# Patient Record
Sex: FEMALE
[De-identification: names, ages, dates, MRNs, and addresses within clinical notes are randomized per-mention and may not be internally consistent; named-entity substitution may affect disease eponyms.]

---

## 2019-08-05 ENCOUNTER — HOSPITAL ENCOUNTER (EMERGENCY)
Dept: HOSPITAL 97 - ER | Age: 19
Discharge: HOME | End: 2019-08-05
Payer: COMMERCIAL

## 2019-08-05 DIAGNOSIS — N83.299: Primary | ICD-10-CM

## 2019-08-05 LAB
BUN BLD-MCNC: 10 MG/DL (ref 7–18)
GLUCOSE SERPLBLD-MCNC: 73 MG/DL (ref 74–106)
HCG SERPL-ACNC: < 1 MIU/ML (ref 1–3)
HCT VFR BLD CALC: 40.1 % (ref 36–45)
LYMPHOCYTES # SPEC AUTO: 2.3 K/UL (ref 0.7–4.9)
PMV BLD: 8.7 FL (ref 7.6–11.3)
POTASSIUM SERPL-SCNC: 3.5 MMOL/L (ref 3.5–5.1)
RBC # BLD: 4.38 M/UL (ref 3.86–4.86)

## 2019-08-05 PROCEDURE — 81025 URINE PREGNANCY TEST: CPT

## 2019-08-05 PROCEDURE — 80048 BASIC METABOLIC PNL TOTAL CA: CPT

## 2019-08-05 PROCEDURE — 74177 CT ABD & PELVIS W/CONTRAST: CPT

## 2019-08-05 PROCEDURE — 84702 CHORIONIC GONADOTROPIN TEST: CPT

## 2019-08-05 PROCEDURE — 81003 URINALYSIS AUTO W/O SCOPE: CPT

## 2019-08-05 PROCEDURE — 36415 COLL VENOUS BLD VENIPUNCTURE: CPT

## 2019-08-05 PROCEDURE — 99284 EMERGENCY DEPT VISIT MOD MDM: CPT

## 2019-08-05 PROCEDURE — 85025 COMPLETE CBC W/AUTO DIFF WBC: CPT

## 2019-08-05 NOTE — EDPHYS
Physician Documentation                                                                           

 [facility]                                                                 

Name: Stacey Kenney                                                                          

Age: 18 yrs                                                                                       

Sex: Female                                                                                       

: 2000                                                                                   

MRN: [id number]                                                                                   

Arrival Date: 2019                                                                          

Time: 14:25                                                                                       

Account#: [id number]                                                                            

Bed 11                                                                                            

Private MD:                                                                                       

ED Physician Micha Solorio                                                                      

HPI:                                                                                              

                                                                                             

01:31 This 18 yrs old  Female presents to ER via Ambulatory with complaints of        kb  

      Abdominal Pain.                                                                             

01:31 The patient presents with abdominal pain right lower quadrant. Onset: The               kb  

      symptoms/episode began/occurred 5 day(s) ago. The symptoms do not radiate. Associated       

      signs and symptoms: Pertinent positives: breast tenderness. The symptoms are described      

      as constant. Modifying factors: The symptoms are alleviated by nothing, the symptoms        

      are aggravated by nothing. Severity of pain: At its worst the pain was moderate in the      

      emergency department the pain is unchanged. The patient has not experienced similar         

      symptoms in the past. The patient has been recently seen by a physician: the patient's      

      primary care provider, with similar presenting complaints, and was sent to the              

      [facility] Emergency Department for further evaluation. Pt           

      reports RLQ pain that started 5 days ago. States she was going to see if it would just      

      go away, but it didn't so she went to her dr. States her Dr sent her here because he        

      said it was either her appendix or pregnancy.                                               

                                                                                                  

OB/GYN:                                                                                           

                                                                                             

14:42 LMP 2019                                                                           hb  

                                                                                                  

Historical:                                                                                       

- Allergies:                                                                                      

14:43 No Known Allergies;                                                                     hb  

- Home Meds:                                                                                      

14:43 None [Active];                                                                          hb  

- PMHx:                                                                                           

14:43 None;                                                                                   hb  

- PSHx:                                                                                           

14:43 None;                                                                                   hb  

                                                                                                  

- Immunization history:: Adult Immunizations up to date.                                          

- Social history:: Smoking status: Patient/guardian denies using tobacco.                         

- Ebola Screening: : No symptoms or risks identified at this time.                                

                                                                                                  

                                                                                                  

ROS:                                                                                              

                                                                                             

01:31 Constitutional: Negative for fever, chills, and weight loss, ENT: Negative for injury,  kb  

      pain, and discharge, Neck: Negative for injury, pain, and swelling, Cardiovascular:         

      Negative for chest pain, palpitations, and edema, Respiratory: Negative for shortness       

      of breath, cough, wheezing, and pleuritic chest pain, Back: Negative for injury and         

      pain, : Negative for injury, bleeding, discharge, and swelling, MS/Extremity:             

      Negative for injury and deformity, Skin: Negative for injury, rash, and discoloration,      

      Neuro: Negative for headache, weakness, numbness, tingling, and seizure.                    

      Abdomen/GI: Positive for abdominal pain.                                                    

                                                                                                  

Exam:                                                                                             

:31 Constitutional:  This is a well developed, well nourished patient who is awake, alert,  kb  

      and in no acute distress. Head/Face:  Normocephalic, atraumatic. Neck:  Trachea             

      midline, no thyromegaly or masses palpated, and no cervical lymphadenopathy.  Supple,       

      full range of motion without nuchal rigidity, or vertebral point tenderness.  No            

      Meningismus. Chest/axilla:  Normal chest wall appearance and motion.  Nontender with no     

      deformity.  No lesions are appreciated. Cardiovascular:  Regular rate and rhythm with a     

      normal S1 and S2.  No gallops, murmurs, or rubs.  Normal PMI, no JVD.  No pulse             

      deficits. Respiratory:  Lungs have equal breath sounds bilaterally, clear to                

      auscultation and percussion.  No rales, rhonchi or wheezes noted.  No increased work of     

      breathing, no retractions or nasal flaring. Back:  No spinal tenderness.  No                

      costovertebral tenderness.  Full range of motion. Skin:  Warm, dry with normal turgor.      

      Normal color with no rashes, no lesions, and no evidence of cellulitis. MS/ Extremity:      

      Pulses equal, no cyanosis.  Neurovascular intact.  Full, normal range of motion. Neuro:     

       Awake and alert, GCS 15, oriented to person, place, time, and situation.  Cranial          

      nerves II-XII grossly intact.  Motor strength 5/5 in all extremities.  Sensory grossly      

      intact.  Cerebellar exam normal.  Normal gait.                                              

:31 Abdomen/GI: Inspection: abdomen appears normal, Bowel sounds: normal, in all quadrants,     

      Palpation: soft, in all quadrants, moderate abdominal tenderness, in the right lower        

      quadrant and left lower quadrant.                                                           

                                                                                                  

Vital Signs:                                                                                      

                                                                                             

14:42  / 77; Pulse 87; Resp 16; Temp 98.5; Pulse Ox 99% on R/A; Weight 65.77 kg; Height hb  

      5 ft. 1 in. (154.94 cm); Pain 8/10;                                                         

15:33  / 89; Pulse 95; Resp 18; Pulse Ox 100% on R/A;                                   aj1 

19:32  / 78; Pulse 70; Resp 16; Pulse Ox 99% on R/A;                                    aj1 

14:42 Body Mass Index 27.40 (65.77 kg, 154.94 cm)                                             hb  

                                                                                                  

MDM:                                                                                              

15:23 Patient medically screened.                                                             kb  

                                                                                             

01:33 Data reviewed: vital signs, nurses notes. Data interpreted: Pulse oximetry: on room air kb  

      is 99 %. Interpretation: normal. Counseling: I had a detailed discussion with the           

      patient and/or guardian regarding: the historical points, exam findings, and any            

      diagnostic results supporting the discharge/admit diagnosis, lab results, radiology         

      results, the need for outpatient follow up, an OB/Gyne specialist, to return to the         

      emergency department if symptoms worsen or persist or if there are any questions or         

      concerns that arise at home.                                                                

                                                                                                  

                                                                                             

15:46 Order name: Basic Metabolic Panel; Complete Time: 16:49                                 kb  

                                                                                             

15:46 Order name: CBC with Diff; Complete Time: 16:31                                         kb  

                                                                                             

15:46 Order name: Quantitative Hcg; Complete Time: 16:49                                      kb  

                                                                                             

16:08 Order name: Urine Dipstick--Ancillary (enter results); Complete Time: 16:31             bd  

                                                                                             

16:08 Order name: Urine Pregnancy--Ancillary (enter results); Complete Time: 16:31            bd  

                                                                                             

16:49 Order name: CT Abd/Pelvis - IV Contrast Only; Complete Time: 17:49                      kb  

                                                                                             

15:38 Order name: Urine Dipstick-Ancillary (obtain specimen); Complete Time: 15:48            kb  

                                                                                             

15:38 Order name: Urine Pregnancy Test (obtain specimen); Complete Time: 15:48                kb  

                                                                                             

15:46 Order name: IV Saline Lock; Complete Time: 16:28                                        kb  

                                                                                             

15:46 Order name: Labs collected and sent; Complete Time: 16:28                               kb  

                                                                                                  

Administered Medications:                                                                         

No medications were administered                                                                  

                                                                                                  

                                                                                                  

Disposition:                                                                                      

07:57 Co-signature as Attending Physician, Micha Solorio MD I agree with the assessment and  wa  

      plan of care.                                                                               

                                                                                                  

Disposition:                                                                                      

19 18:50 Discharged to Home. Impression: Other ovarian cysts.                               

- Condition is Stable.                                                                            

- Discharge Instructions: Ovarian Cyst, Easy-to-Read.                                             

- Prescriptions for Diclofenac Sodium 75 mg Oral Tablet, Delayed Release (E.C.) - take            

  1 tablet by ORAL route 2 times per day As needed; 30 tablet.                                    

- Medication Reconciliation Form, Thank You Letter, Antibiotic Education, Prescription            

  Opioid Use form.                                                                                

- Follow up: Emergency Department; When: As needed; Reason: Worsening of condition.               

  Follow up: Private Physician; When: 2 - 3 days; Reason: Recheck today's complaints,             

  Continuance of care, Re-evaluation by your physician.                                           

                                                                                                  

                                                                                                  

                                                                                                  

Signatures:                                                                                       

Dispatcher [employer]                           EDMS                                                 

Shelly Freed, SANTY WANG-Janett Kim RN                     RN   aj1                                                  

Charlee Schwartz RN RN                                                      

Micha Solorio MD MD   wa                                                   

                                                                                                  

Corrections: (The following items were deleted from the chart)                                    

                                                                                             

19:33 18:50 2019 18:50 Discharged to Home. Impression: Other ovarian cysts. Condition   aj1 

      is Stable. Forms are Medication Reconciliation Form, Thank You Letter, Antibiotic           

      Education, Prescription Opioid Use. Follow up: Emergency Department; When: As needed;       

      Reason: Worsening of condition. Follow up: Private Physician; When: 2 - 3 days; Reason:     

      Recheck today's complaints, Continuance of care, Re-evaluation by your physician. kb        

                                                                                                  

**************************************************************************************************

## 2019-08-05 NOTE — XMS REPORT
Patient Summary Document

 Created on:2019



Patient:VINAYAK KRISHNAMURTHY

Sex:Female

:2000

External Reference #:[id number]





Demographics







 Address  [address] [place] [id number]

 

 Home Phone  [phone]

 

 Work Phone  [phone]

 

 Email Address  [email or web address]

 

 Preferred Language  Unknown

 

 Marital Status  Unknown

 

 Roman [employer] Affiliation  Unknown

 

 Race  Unknown

 

 Additional Race(s)  Unavailable

 

 Ethnic Group  Unknown









Author







 Organization  [facility]

 

 Address  [address] [place] [address] Dr. Sow [address] [place] [id number]

 

 Phone  [phone]









Care Team Providers







 Name  Role  Phone

 

 Unavailable  Unavailable  Unavailable









Problems

This patient has no known problems.



Allergies, Adverse Reactions, Alerts

This patient has no known allergies or adverse reactions.



Medications

This patient has no known medications.

## 2019-08-05 NOTE — ER
Nurse's Notes                                                                                     

 [facility]                                                                 

Name: Stacey Kenney                                                                          

Age: 18 yrs                                                                                       

Sex: Female                                                                                       

: 2000                                                                                   

MRN: [id number]                                                                                   

Arrival Date: 2019                                                                          

Time: 14:25                                                                                       

Account#: [id number]                                                                            

Bed 11                                                                                            

Private MD:                                                                                       

Diagnosis: Other ovarian cysts                                                                    

                                                                                                  

Presentation:                                                                                     

                                                                                             

14:40 Presenting complaint: Abdominal pain x 5 days. Pt report pain started in the RLQ, now   hb  

      pain is diffuse. Denies N/V/D/fever. LMP . Transition of care: patient was not          

      received from another setting of care. Onset of symptoms was 2019. Risk          

      Assessment: Do you want to hurt yourself or someone else? Patient reports no desire to      

      harm self or others. Initial Sepsis Screen: Does the patient meet any 2 criteria? No.       

      Patient's initial sepsis screen is negative. Does the patient have a suspected source       

      of infection? No. Patient's initial sepsis screen is negative. Care prior to arrival:       

      None.                                                                                       

14:40 Method Of Arrival: Ambulatory                                                           hb  

14:40 Acuity: BARON 3                                                                           hb  

                                                                                                  

OB/GYN:                                                                                           

14:42 LMP 2019                                                                           hb  

                                                                                                  

Historical:                                                                                       

- Allergies:                                                                                      

14:43 No Known Allergies;                                                                     hb  

- Home Meds:                                                                                      

14:43 None [Active];                                                                          hb  

- PMHx:                                                                                           

14:43 None;                                                                                   hb  

- PSHx:                                                                                           

14:43 None;                                                                                   hb  

                                                                                                  

- Immunization history:: Adult Immunizations up to date.                                          

- Social history:: Smoking status: Patient/guardian denies using tobacco.                         

- Ebola Screening: : No symptoms or risks identified at this time.                                

                                                                                                  

                                                                                                  

Screening:                                                                                        

15:33 Abuse screen: Denies threats or abuse. Denies injuries from another. Nutritional        aj1 

      screening: No deficits noted. Tuberculosis screening: No symptoms or risk factors           

      identified.                                                                                 

19:33 Fall Risk None identified.                                                              aj1 

                                                                                                  

Assessment:                                                                                       

15:33 General: Appears in no apparent distress. comfortable, Behavior is calm, cooperative,   aj1 

      appropriate for age. Pain: Complains of pain in right lower quadrant Pain does not          

      radiate. Pain currently is 7 out of 10 on a pain scale. Neuro: Level of Consciousness       

      is awake, alert, obeys commands, Oriented to person, place, time, situation.                

      Cardiovascular: Patient's skin is warm and dry. Respiratory: Airway is patent               

      Respiratory effort is even, unlabored, Respiratory pattern is regular, symmetrical. GI:     

      Abdomen is non-distended, Bowel sounds present X 4 quads. Abd is soft X 4 quads Abdomen     

      is tender to palpation in right lower quadrant. : No signs and/or symptoms were           

      reported regarding the genitourinary system. EENT: No signs and/or symptoms were            

      reported regarding the EENT system. Derm: No signs and/or symptoms reported regarding       

      the dermatologic system. Skin is pink, warm \T\ dry. normal. Musculoskeletal: No signs      

      and/or symptoms reported regarding the musculoskeletal system. Circulation, motion, and     

      sensation intact.                                                                           

16:37 Reassessment: Patient appears in no apparent distress at this time. No changes from     aj1 

      previously documented assessment. Patient and/or family updated on plan of care and         

      expected duration. Pain level reassessed. Patient is alert, oriented x 3, equal             

      unlabored respirations, skin warm/dry/pink.                                                 

17:30 Reassessment: Patient and/or family updated on plan of care and expected duration. Pain aj1 

      level reassessed. General: Appears in no apparent distress. comfortable, Behavior is        

      calm, cooperative, appropriate for age. Neuro: Level of Consciousness is awake, alert,      

      obeys commands, Oriented to person, place, time, situation. Cardiovascular: Patient's       

      skin is warm and dry. Respiratory: Airway is patent Respiratory effort is even,             

      unlabored, Respiratory pattern is regular, symmetrical. GI: Abdomen is flat,                

      non-distended. Derm: No signs and/or symptoms reported regarding the dermatologic           

      system. Skin is pink, warm \T\ dry. normal. Musculoskeletal: No signs and/or symptoms       

      reported regarding the musculoskeletal system. Circulation, motion, and sensation           

      intact.                                                                                     

18:15 Reassessment: Patient appears in no apparent distress at this time. No changes from     aj1 

      previously documented assessment. Patient and/or family updated on plan of care and         

      expected duration. Pain level reassessed. Patient is alert, oriented x 3, equal             

      unlabored respirations, skin warm/dry/pink.                                                 

19:31 Reassessment: Patient appears in no apparent distress at this time. No changes from     aj1 

      previously documented assessment. Patient and/or family updated on plan of care and         

      expected duration. Pain level reassessed. Patient is alert, oriented x 3, equal             

      unlabored respirations, skin warm/dry/pink.                                                 

                                                                                                  

Vital Signs:                                                                                      

14:42  / 77; Pulse 87; Resp 16; Temp 98.5; Pulse Ox 99% on R/A; Weight 65.77 kg; Height hb  

      5 ft. 1 in. (154.94 cm); Pain 8/10;                                                         

15:33  / 89; Pulse 95; Resp 18; Pulse Ox 100% on R/A;                                   aj1 

19:32  / 78; Pulse 70; Resp 16; Pulse Ox 99% on R/A;                                    aj1 

14:42 Body Mass Index 27.40 (65.77 kg, 154.94 cm)                                             hb  

                                                                                                  

ED Course:                                                                                        

14:25 Patient arrived in ED.                                                                  mr  

14:42 Triage completed.                                                                       hb  

14:43 Arm band placed on.                                                                     hb  

15:22 Shelly Freed FNP-C is PHCP.                                                        kb  

15:22 Micha Solorio MD is Attending Physician.                                             kb  

15:33 Janett Christopher, RN is Primary Nurse.                                                   aj1 

15:33 Patient has correct armband on for positive identification.                             aj1 

15:33 No provider procedures requiring assistance completed.                                  aj1 

16:28 Initial lab(s) drawn, by me, sent to lab. Inserted saline lock: 22 gauge in left        aj1 

      antecubital area, using aseptic technique. Blood collected.                                 

17:13 CT completed. Patient tolerated procedure well. Patient moved to CT. Patient moved back nj  

      from CT.                                                                                    

17:17 CT Abd/Pelvis - IV Contrast Only In Process Unspecified.                                EDMS

19:32 IV discontinued, intact, bleeding controlled, No redness/swelling at site. Pressure     aj1 

      dressing applied.                                                                           

                                                                                                  

Administered Medications:                                                                         

No medications were administered                                                                  

                                                                                                  

                                                                                                  

Outcome:                                                                                          

18:50 Discharge ordered by MD.                                                                kb  

19:32 Discharged to home ambulatory.                                                          aj1 

19:32 Condition: good                                                                             

19:32 Discharge instructions given to patient, Instructed on discharge instructions, follow       

      up and referral plans. medication usage, Demonstrated understanding of instructions,        

      follow-up care, medications, Prescriptions given X 1.                                       

19:33 Patient left the ED.                                                                    aj1 

                                                                                                  

Signatures:                                                                                       

Dispatcher [employer]                           EDMS                                                 

Shelly Freed FNP-C FNP-Janett Kim, RN                     RN   [facility]                                                  

Katja Hurtado                                                                                    

Charlee Schwartz RN RN                                                      

Lito Martinez                                                   

                                                                                                  

Corrections: (The following items were deleted from the chart)                                    

14:43 14:40 Presenting complaint: Abdominal pain x 5 days. Pt report pain started in the RLQ, hb  

      now pain is diffuse. Denies N/V/D/fever. LMP  hb                                        

15:37 15:33 Pain: Complains of pain in left lower quadrant Pain does not radiate. Pain        aj1 

      currently is 7 out of 10 on a pain scale. aj1                                               

15:37 15:33 GI: Abdomen is non-distended, Bowel sounds present X 4 quads. Abd is soft X 4     aj1 

      quads Abdomen is tender to palpation in left lower quadrant aj1                             

                                                                                                  

**************************************************************************************************

## 2019-08-05 NOTE — RAD REPORT
EXAM DESCRIPTION:  CT - Abdomen   Pelvis W Contrast - 8/5/2019 5:16 pm

 

CLINICAL HISTORY:  Abdominal pain

 

COMPARISON:  February 2018

 

TECHNIQUE:  Computed axial tomography of the abdomen pelvis was obtained. 100 cc Isovue-300 was admin
istered intravenously. Oral contrast was not requested which limits evaluation of bowel.

 

All CT scans are performed using dose optimization technique as appropriate and may include automated
 exposure control or mA/KV adjustment according to patient size.

 

FINDINGS:  Fatty liver

 

Spleen, pancreas, adrenal and left kidney appear unremarkable. 2 millimeter nonobstructing right farhat
l calculus

 

There is no evidence of diverticulitis.

 

5.5 centimeter right ovarian cyst with a small amount of free fluid

 

IMPRESSION:  5.5 centimeter right ovarian cyst with a small amount of free fluid . Follow up ultrasou
nd in 6 weeks recommended to assess stability/resolution

## 2020-02-15 ENCOUNTER — HOSPITAL ENCOUNTER (EMERGENCY)
Dept: HOSPITAL 97 - ER | Age: 20
Discharge: HOME | End: 2020-02-15
Payer: SELF-PAY

## 2020-02-15 DIAGNOSIS — J04.0: Primary | ICD-10-CM

## 2020-02-15 PROCEDURE — 87081 CULTURE SCREEN ONLY: CPT

## 2020-02-15 PROCEDURE — 87070 CULTURE OTHR SPECIMN AEROBIC: CPT

## 2020-02-15 PROCEDURE — 87804 INFLUENZA ASSAY W/OPTIC: CPT

## 2020-02-15 PROCEDURE — 99283 EMERGENCY DEPT VISIT LOW MDM: CPT

## 2020-02-15 NOTE — XMS REPORT
Patient Summary Document

 Created on:February 15, 2020



Patient:VINAYAK KRISHNAMURTHY

Sex:Female

:2000

External Reference #:[id number]





Demographics







 Address  [address] [place] [id number]

 

 Home Phone  [phone]

 

 Work Phone  [phone]

 

 Email Address  [email or web address]

 

 Preferred Language  Unknown

 

 Marital Status  Unknown

 

 Anabaptism Affiliation  Unknown

 

 Race  Unknown

 

 Additional Race(s)  Unavailable

 

 Ethnic Group  Unknown









Author







 Organization  [facility]

 

 Address  [address] [place] [address] Dr. Sow [address] [place] [id number]

 

 Phone  [phone]









Care Team Providers







 Name  Role  Phone

 

 Unavailable  Unavailable  Unavailable









Problems

This patient has no known problems.



Allergies, Adverse Reactions, Alerts

This patient has no known allergies or adverse reactions.



Medications

This patient has no known medications.

## 2020-02-15 NOTE — EDPHYS
Physician Documentation                                                                           

 [facility]                                                                 

Name: Stacey Kenney                                                                          

Age: 19 yrs                                                                                       

Sex: Female                                                                                       

: 2000                                                                                   

MRN: [id number]                                                                                   

Arrival Date: 02/15/2020                                                                          

Time: 17:30                                                                                       

Account#: [id number]                                                                            

Bed 18                                                                                            

Private MD: Micha Garcia E                                                                     

ED Physician Prince Gracia                                                                      

OB/GYN:                                                                                           

02/15                                                                                             

17:52 LMP 2020                                                                            aj1 

                                                                                                  

Historical:                                                                                       

- Allergies:                                                                                      

17:52 No Known Allergies;                                                                     aj1 

- Home Meds:                                                                                      

17:52 None [Active];                                                                          aj1 

- PMHx:                                                                                           

17:52 None;                                                                                   aj1 

- PSHx:                                                                                           

17:52 None;                                                                                   aj1 

                                                                                                  

- Immunization history:: Flu vaccine is not up to date.                                           

- Coronavirus screen:: The patient has NOT traveled to [place] in the past 14 days.                 

- Social history:: Smoking status: Patient/guardian denies using tobacco.                         

- Ebola Screening: : Patient denies travel to an Ebola-affected area in the 21 days               

  before illness onset.                                                                           

                                                                                                  

                                                                                                  

ROS:                                                                                              

19:27 Constitutional: Negative for fever, chills, and weight loss, Eyes: Negative for injury, snw 

      pain, redness, and discharge, Neck: Negative for injury, pain, and swelling,                

      Cardiovascular: Negative for chest pain, palpitations, and edema, Respiratory: Negative     

      for shortness of breath, cough, wheezing, and pleuritic chest pain, Abdomen/GI:             

      Negative for abdominal pain, nausea, vomiting, diarrhea, and constipation, Back:            

      Negative for injury and pain, : Negative for injury, bleeding, discharge, and             

      swelling, MS/Extremity: Negative for injury and deformity, Skin: Negative for injury,       

      rash, and discoloration, Neuro: Negative for headache, weakness, numbness, tingling,        

      and seizure, Psych: Negative for depression, anxiety, suicide ideation, homicidal           

      ideation, and hallucinations.                                                               

19:27 ENT: Positive for sinus congestion, sore throat.                                            

                                                                                                  

Exam:                                                                                             

19:09 Constitutional:  This is a well developed, well nourished patient who is awake, alert,  snw 

      and in no acute distress. Head/Face:  Normocephalic, atraumatic. Eyes:  Pupils equal        

      round and reactive to light, extra-ocular motions intact.  Lids and lashes normal.          

      Conjunctiva and sclera are non-icteric and not injected.  Cornea within normal limits.      

      Periorbital areas with no swelling, redness, or edema. Neck:  Trachea midline, no           

      thyromegaly or masses palpated, and no cervical lymphadenopathy.  Supple, full range of     

      motion without nuchal rigidity, or vertebral point tenderness.  No Meningismus.             

      Chest/axilla:  Normal chest wall appearance and motion.  Nontender with no deformity.       

      No lesions are appreciated. Cardiovascular:  Regular rate and rhythm with a normal S1       

      and S2.  No gallops, murmurs, or rubs.  Normal PMI, no JVD.  No pulse deficits.             

      Respiratory:  Lungs have equal breath sounds bilaterally, clear to auscultation and         

      percussion.  No rales, rhonchi or wheezes noted.  No increased work of breathing, no        

      retractions or nasal flaring. Abdomen/GI:  Soft, non-tender, with normal bowel sounds.      

      No distension or tympany.  No guarding or rebound.  No evidence of tenderness               

      throughout. Back:  No spinal tenderness.  No costovertebral tenderness.  Full range of      

      motion. Skin:  Warm, dry with normal turgor.  Normal color with no rashes, no lesions,      

      and no evidence of cellulitis. MS/ Extremity:  Pulses equal, no cyanosis.                   

      Neurovascular intact.  Full, normal range of motion. Neuro:  Awake and alert, GCS 15,       

      oriented to person, place, time, and situation.  Cranial nerves II-XII grossly intact.      

      Motor strength 5/5 in all extremities.  Sensory grossly intact.  Cerebellar exam            

      normal.  Normal gait. Psych:  Awake, alert, with orientation to person, place and time.     

       Behavior, mood, and affect are within normal limits.                                       

19:09 ENT: External ear(s): are unremarkable, Ear canal(s): are normal, TM's: are normal,         

      Nose: is normal, Mouth: is normal, Posterior pharynx: Tonsils: bilaterally enlarged,        

      erythema, that is mild, Voice: is hoarse.                                                   

                                                                                                  

Vital Signs:                                                                                      

17:52  / 97; Pulse 86; Resp 18; Temp 98.1; Pulse Ox 98% on R/A; Weight 111.13 kg (R);   aj1 

      Height 5 ft. 1 in. (154.94 cm) (R); Pain 8/10;                                              

19:00  / 80; Pulse 82; Resp 16 S; Temp 97.8; Pulse Ox 99% ; Pain 4/10;                  hb  

17:52 Body Mass Index 46.29 (111.13 kg, 154.94 cm)                                            [facility] 

                                                                                                  

MDM:                                                                                              

18:06 Patient medically screened.                                                             [email or web address] 

19:08 Data reviewed: vital signs, nurses notes. Data interpreted: Pulse oximetry: on room air snw 

      is 98 %. Interpretation: normal. Counseling: I had a detailed discussion with the           

      patient and/or guardian regarding: the historical points, exam findings, and any            

      diagnostic results supporting the discharge/admit diagnosis, the presence of at least       

      one elevated blood pressure reading (>120/80) during this emergency department visit,       

      lab results, the need for outpatient follow up, to return to the emergency department       

      if symptoms worsen or persist or if there are any questions or concerns that arise at       

      home. Special discussion: I have referred the patient to see his PCP for further            

      evaluation of high blood pressure. Based on the history and exam findings, there is no      

      indication for further emergent testing or inpatient evaluation. I discussed with the       

      patient/guardian the need to see the primary care provider for further evaluation of        

      the symptoms.                                                                               

                                                                                                  

02/15                                                                                             

17:54 Order name: Flu                                                                         [facility] 

02/15                                                                                             

17:54 Order name: Strep                                                                       [facility] 

02/15                                                                                             

18:32 Order name: Influenza Screen (A ; Complete Time: 18:35                                  [facility]

02/15                                                                                             

18:32 Order name: Group A Streptococcus Rapid Sc; Complete Time: 18:35                        [facility]

                                                                                                  

Administered Medications:                                                                         

19:24 Drug: Decadron 8 mg Route: PO;                                                          hb  

19:26 Follow up: Response: Medication administered at discharge.                              hb  

19:24 Drug: Phenergan 25 mg Route: PO;                                                        hb  

19:26 Follow up: Response: Medication administered at discharge.                                

                                                                                                  

                                                                                                  

Disposition:                                                                                      

02/15/20 19:05 Discharged to Home. Impression: Acute laryngitis.                                  

- Condition is Stable.                                                                            

- Discharge Instructions: Hypertension, Laryngitis, Upper Respiratory Infection, Adult,           

  Form - Blood Pressure Record Sheet.                                                             

- Prescriptions for Prednisone 20 mg Oral Tablet - take 2 tablet by ORAL route once               

  daily for 5 days; 10 tablet.                                                                    

- Medication Reconciliation Form, Thank You Letter, Antibiotic Education, Prescription            

  Opioid Use, Work release form form.                                                             

- Follow up: Emergency Department; When: As needed; Reason: Worsening of condition.               

  Follow up: Micha Garcia MD; When: 2 - 3 days; Reason: Recheck today's complaints,            

  Continuance of care, Re-evaluation by your physician.                                           

                                                                                                  

                                                                                                  

                                                                                                  

Addendum:                                                                                         

2020                                                                                        

     08:27 Co-signature as Attending Physician, Prince Gracia MD I agree with the assessment and  c
ha

           plan of care.                                                                          

                                                                                                  

Signatures:                                                                                       

Dispatcher [employer]                           Janett Jauregui, RN                     RN   aj1                                                  

Prince Gracia MD MD cha Therrien, Shelly, FNP-C                 FNP-Csnw                                                  

Charlee Schwartz, RN                     RN   hb                                                   

                                                                                                  

Corrections: (The following items were deleted from the chart)                                    

02/15                                                                                             

19:27 19:05 02/15/2020 19:05 Discharged to Home. Impression: Acute laryngitis. Condition is   hb  

      Stable. Forms are Work release form, Medication Reconciliation Form, Thank You Letter,      

      Antibiotic Education, Prescription Opioid Use. Follow up: Emergency Department; When:       

      As needed; Reason: Worsening of condition. Follow up: Micha Garcia; When: 2 - 3 days;      

      Reason: Recheck today's complaints, Continuance of care, Re-evaluation by your              

      physician. snw                                                                              

                                                                                                  

**************************************************************************************************

## 2020-02-15 NOTE — ER
Nurse's Notes                                                                                     

 [facility]                                                                 

Name: Stacey Kenney                                                                          

Age: 19 yrs                                                                                       

Sex: Female                                                                                       

: 2000                                                                                   

MRN: [id number]                                                                                   

Arrival Date: 02/15/2020                                                                          

Time: 17:30                                                                                       

Account#: [id number]                                                                            

Bed 18                                                                                            

Private MD: Micha Garcia E                                                                     

Diagnosis: Acute laryngitis                                                                       

                                                                                                  

Presentation:                                                                                     

02/15                                                                                             

17:51 Presenting complaint: Patient states: Sore throat, ear pain, body aches, and fever      aj1 

      since Wednesday. Transition of care: patient was not received from another setting of       

      care. Onset of symptoms was 2020. Risk Assessment: Do you want to hurt             

      yourself or someone else? Patient reports no desire to harm self or others. Initial         

      Sepsis Screen: Does the patient meet any 2 criteria? No. Patient's initial sepsis           

      screen is negative. Does the patient have a suspected source of infection? Yes: Other:      

      fever sore throat body aches. Care prior to arrival: None.                                  

17:51 Method Of Arrival: Ambulatory                                                           aj1 

17:51 Acuity: BARON 4                                                                           aj1 

                                                                                                  

Triage Assessment:                                                                                

17:52 General: Appears in no apparent distress. uncomfortable, Behavior is calm, cooperative, aj1 

      appropriate for age. Pain: Complains of pain in left aspect of posterior pharynx and        

      right aspect of posterior pharynx. EENT: Reports sore throat, ear pain. Neuro: Level of     

      Consciousness is awake, alert, obeys commands. Cardiovascular: Patient's skin is warm       

      and dry. Respiratory: Airway is patent Respiratory effort is even, unlabored,               

      Respiratory pattern is regular, symmetrical.                                                

                                                                                                  

OB/GYN:                                                                                           

17:52 LMP 2020                                                                            aj1 

                                                                                                  

Historical:                                                                                       

- Allergies:                                                                                      

17:52 No Known Allergies;                                                                     aj1 

- Home Meds:                                                                                      

17:52 None [Active];                                                                          aj1 

- PMHx:                                                                                           

17:52 None;                                                                                   aj1 

- PSHx:                                                                                           

17:52 None;                                                                                   aj1 

                                                                                                  

- Immunization history:: Flu vaccine is not up to date.                                           

- Coronavirus screen:: The patient has NOT traveled to [place] in the past 14 days.                 

- Social history:: Smoking status: Patient/guardian denies using tobacco.                         

- Ebola Screening: : Patient denies travel to an Ebola-affected area in the 21 days               

  before illness onset.                                                                           

                                                                                                  

                                                                                                  

Screenin:07 Abuse screen: Denies threats or abuse. Nutritional screening: No deficits noted.        tw2 

      Tuberculosis screening: No symptoms or risk factors identified. Fall Risk None              

      identified.                                                                                 

                                                                                                  

Assessment:                                                                                       

18:07 General: Appears in no apparent distress. Behavior is calm, cooperative, appropriate    tw2 

      for age. Pain: Complains of pain in right ear, left ear, uvula, left aspect of              

      posterior pharynx and right aspect of posterior pharynx. Neuro: Level of Consciousness      

      is awake, alert, obeys commands, Oriented to person, place, time, situation.                

      Cardiovascular: Patient's skin is warm and dry. Respiratory: Reports cough that is          

      Airway is patent Respiratory effort is even, unlabored, Respiratory pattern is regular,     

      symmetrical. GI: No signs and/or symptoms were reported involving the gastrointestinal      

      system. Abdomen is flat. : No signs and/or symptoms were reported regarding the           

      genitourinary system. EENT: Reports nasal congestion nasal discharge pain in left ear       

      and right ear when swallowing. Derm: No signs and/or symptoms reported regarding the        

      dermatologic system. Musculoskeletal: "body aches".                                         

19:00 Reassessment: Patient appears in no apparent distress at this time. Patient and/or      hb  

      family updated on plan of care and expected duration. Pain level reassessed. Patient is     

      alert, oriented x 3, equal unlabored respirations, skin warm/dry/pink.                      

                                                                                                  

Vital Signs:                                                                                      

17:52  / 97; Pulse 86; Resp 18; Temp 98.1; Pulse Ox 98% on R/A; Weight 111.13 kg (R);   aj1 

      Height 5 ft. 1 in. (154.94 cm) (R); Pain 8/10;                                              

19:00  / 80; Pulse 82; Resp 16 S; Temp 97.8; Pulse Ox 99% ; Pain 4/10;                  hb  

17:52 Body Mass Index 46.29 (111.13 kg, 154.94 cm)                                            aj1 

                                                                                                  

ED Course:                                                                                        

17:30 Patient arrived in ED.                                                                  rg4 

17:30 Micha Garcia MD is Private Physician.                                                rg4 

17:52 Triage completed.                                                                       aj1 

17:52 Arm band placed on Patient placed in waiting room, Patient notified of wait time.       aj1 

18:06 [facility]                                                        snw 

18:06 Prince Gracia MD is Attending Physician.                                             snw 

18:07 Erica Black RN is Primary Nurse.                                                        tw2 

18:07 Bed in low position. Call light in reach. Adult w/ patient.                             tw2 

19:00 Report given to ALEJANDRINA Deshpande.                                                             tw2 

19:05 Micha Garcia MD is Referral Physician.                                               snw 

19:25 No provider procedures requiring assistance completed. Patient did not have IV access   hb  

      during this emergency room visit.                                                           

                                                                                                  

Administered Medications:                                                                         

19:24 Drug: Decadron 8 mg Route: PO;                                                          hb  

19:26 Follow up: Response: Medication administered at discharge.                              hb  

19:24 Drug: Phenergan 25 mg Route: PO;                                                        hb  

19:26 Follow up: Response: Medication administered at discharge.                              hb  

                                                                                                  

                                                                                                  

Outcome:                                                                                          

19:05 Discharge ordered by MD.                                                                snw 

19:25 Discharged to home ambulatory, with significant other.                                  hb  

19:25 Condition: stable                                                                           

19:25 Discharge instructions given to patient, Instructed on discharge instructions, follow       

      up and referral plans. medication usage, Demonstrated understanding of instructions,        

      follow-up care, medications, Prescriptions given X 1.                                       

19:27 Patient left the ED.                                                                    hb  

                                                                                                  

Signatures:                                                                                       

Janett Christopher RN                     RN   aj1                                                  

Ginger Ryan, FNP-C                 FNP-Csnw                                                  

Charlee Schwartz, RN                     ALEJANDRINA                                                      

Erica Black RN                          RN   tw2                                                  

Anushka Gill                                 rg4                                                  

                                                                                                  

**************************************************************************************************

## 2020-02-19 ENCOUNTER — HOSPITAL ENCOUNTER (EMERGENCY)
Dept: HOSPITAL 97 - ER | Age: 20
Discharge: HOME | End: 2020-02-19
Payer: SELF-PAY

## 2020-02-19 DIAGNOSIS — Z04.1: ICD-10-CM

## 2020-02-19 DIAGNOSIS — R51: Primary | ICD-10-CM

## 2020-02-19 PROCEDURE — 70450 CT HEAD/BRAIN W/O DYE: CPT

## 2020-02-19 PROCEDURE — 99283 EMERGENCY DEPT VISIT LOW MDM: CPT
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
## 2020-02-19 NOTE — XMS REPORT
Patient Summary Document

 Created on:2020



Patient:VINAYAK KRISHNAMURTHY

Sex:Female

:2000

External Reference #:[id number]





Demographics







 Address  [address] [place] [id number]

 

 Home Phone  [phone]

 

 Email Address  [email or web address]

 

 Preferred Language  Unknown

 

 Marital Status  Unknown

 

 Hindu Affiliation  Unknown

 

 Race  Unknown

 

 Additional Race(s)  Unavailable

 

 Ethnic Group  Unknown









Author







 Organization  [facility]

 

 Address  [address] [place] [address] Dr. Rolle. [address] [place] [id number]

 

 Phone  [phone]









Care Team Providers







 Name  Role  Phone

 

 Unavailable  Unavailable  Unavailable









Problems

This patient has no known problems.



Allergies, Adverse Reactions, Alerts

This patient has no known allergies or adverse reactions.



Medications

This patient has no known medications.
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
